# Patient Record
Sex: MALE | Race: ASIAN | Employment: STUDENT
[De-identification: names, ages, dates, MRNs, and addresses within clinical notes are randomized per-mention and may not be internally consistent; named-entity substitution may affect disease eponyms.]

---

## 2020-12-21 ENCOUNTER — HOSPITAL ENCOUNTER (INPATIENT)
Age: 20
End: 2020-12-21
Payer: COMMERCIAL

## 2020-12-21 ENCOUNTER — HOSPITAL ENCOUNTER (INPATIENT)
Facility: CLINIC | Age: 20
LOS: 1 days | Discharge: HOME | DRG: 881 | End: 2020-12-21
Attending: EMERGENCY MEDICINE | Admitting: PSYCHIATRY & NEUROLOGY
Payer: COMMERCIAL

## 2020-12-21 VITALS
DIASTOLIC BLOOD PRESSURE: 82 MMHG | WEIGHT: 117.7 LBS | RESPIRATION RATE: 16 BRPM | HEART RATE: 89 BPM | SYSTOLIC BLOOD PRESSURE: 117 MMHG | TEMPERATURE: 97.9 F | OXYGEN SATURATION: 96 %

## 2020-12-21 DIAGNOSIS — Z20.828 EXPOSURE TO SARS-ASSOCIATED CORONAVIRUS: ICD-10-CM

## 2020-12-21 DIAGNOSIS — F32.9 CURRENT EPISODE OF MAJOR DEPRESSIVE DISORDER WITHOUT PRIOR EPISODE, UNSPECIFIED DEPRESSION EPISODE SEVERITY: ICD-10-CM

## 2020-12-21 DIAGNOSIS — F41.1 GENERALIZED ANXIETY DISORDER: ICD-10-CM

## 2020-12-21 DIAGNOSIS — F32.A DEPRESSION, UNSPECIFIED DEPRESSION TYPE: ICD-10-CM

## 2020-12-21 LAB
AMPHETAMINES UR QL SCN: NEGATIVE
BARBITURATES UR QL: NEGATIVE
BENZODIAZ UR QL: NEGATIVE
CANNABINOIDS UR QL SCN: NEGATIVE
COCAINE UR QL: NEGATIVE
ETHANOL UR QL SCN: NEGATIVE
FLUAV+FLUBV RNA SPEC QL NAA+PROBE: NEGATIVE
FLUAV+FLUBV RNA SPEC QL NAA+PROBE: NEGATIVE
LABORATORY COMMENT REPORT: NORMAL
OPIATES UR QL SCN: NEGATIVE
RSV RNA SPEC QL NAA+PROBE: NEGATIVE
SARS-COV-2 RNA SPEC QL NAA+PROBE: NEGATIVE
SPECIMEN SOURCE: NORMAL

## 2020-12-21 PROCEDURE — 124N000002 HC R&B MH UMMC

## 2020-12-21 PROCEDURE — 80320 DRUG SCREEN QUANTALCOHOLS: CPT | Performed by: EMERGENCY MEDICINE

## 2020-12-21 PROCEDURE — 90791 PSYCH DIAGNOSTIC EVALUATION: CPT

## 2020-12-21 PROCEDURE — 99284 EMERGENCY DEPT VISIT MOD MDM: CPT | Performed by: EMERGENCY MEDICINE

## 2020-12-21 PROCEDURE — 80307 DRUG TEST PRSMV CHEM ANLYZR: CPT | Performed by: EMERGENCY MEDICINE

## 2020-12-21 PROCEDURE — 87636 SARSCOV2 & INF A&B AMP PRB: CPT | Performed by: EMERGENCY MEDICINE

## 2020-12-21 PROCEDURE — 99285 EMERGENCY DEPT VISIT HI MDM: CPT | Mod: 25 | Performed by: EMERGENCY MEDICINE

## 2020-12-21 PROCEDURE — 99235 HOSP IP/OBS SAME DATE MOD 70: CPT | Mod: GT | Performed by: CLINICAL NURSE SPECIALIST

## 2020-12-21 RX ORDER — HYDROXYZINE HYDROCHLORIDE 25 MG/1
25-50 TABLET, FILM COATED ORAL EVERY 4 HOURS PRN
Status: DISCONTINUED | OUTPATIENT
Start: 2020-12-21 | End: 2020-12-21 | Stop reason: HOSPADM

## 2020-12-21 RX ORDER — MAGNESIUM HYDROXIDE/ALUMINUM HYDROXICE/SIMETHICONE 120; 1200; 1200 MG/30ML; MG/30ML; MG/30ML
30 SUSPENSION ORAL EVERY 4 HOURS PRN
Status: DISCONTINUED | OUTPATIENT
Start: 2020-12-21 | End: 2020-12-21 | Stop reason: HOSPADM

## 2020-12-21 RX ORDER — AMOXICILLIN 250 MG
1 CAPSULE ORAL 2 TIMES DAILY PRN
Status: DISCONTINUED | OUTPATIENT
Start: 2020-12-21 | End: 2020-12-21 | Stop reason: HOSPADM

## 2020-12-21 RX ORDER — LANOLIN ALCOHOL/MO/W.PET/CERES
3 CREAM (GRAM) TOPICAL
COMMUNITY

## 2020-12-21 RX ORDER — ACETAMINOPHEN 325 MG/1
650 TABLET ORAL EVERY 4 HOURS PRN
Status: DISCONTINUED | OUTPATIENT
Start: 2020-12-21 | End: 2020-12-21 | Stop reason: HOSPADM

## 2020-12-21 RX ORDER — TRAZODONE HYDROCHLORIDE 50 MG/1
50 TABLET, FILM COATED ORAL
Status: DISCONTINUED | OUTPATIENT
Start: 2020-12-21 | End: 2020-12-21 | Stop reason: HOSPADM

## 2020-12-21 SDOH — HEALTH STABILITY: MENTAL HEALTH: HOW OFTEN DO YOU HAVE A DRINK CONTAINING ALCOHOL?: NEVER

## 2020-12-21 ASSESSMENT — ACTIVITIES OF DAILY LIVING (ADL)
HYGIENE/GROOMING: INDEPENDENT
LAUNDRY: WITH SUPERVISION
ORAL_HYGIENE: INDEPENDENT
HYGIENE/GROOMING: INDEPENDENT
DRESS: INDEPENDENT
DRESS: INDEPENDENT
LAUNDRY: WITH SUPERVISION
ORAL_HYGIENE: INDEPENDENT

## 2020-12-21 NOTE — SAFE
Milton Enriquez  December 21, 2020    Due to SI with a plan to drown himself in the river and being found by police at the river after walking for an hour with nothing but a lightweight shirt, pants, and shoes; this patient will be admitted for further assessment, safety, and stabilization.  He denied SIB or HI.  He dened Psychosis or Kita.  The ED MD agreed to this plan.  He was not aggressive.        Current Suicidal Ideation/Self-Injurious Concerns/Methods: Other drown in the river    Inappropriate Sexual Behavior: No    Aggression/Homicidal Ideation: None - N/A      For additional details see full DEC assessment.       Tricia Ewing, LICSW

## 2020-12-21 NOTE — PLAN OF CARE
BEHAVIORAL TEAM DISCUSSION    Participants: 4A Provider: Debra Naegele, APRN, CNS; 4A RN's:  Apoorva Sandoval, RN; 4A CTC's:  Flor Smith, (CTC).  Progress: Improving.  Continued Stay Criteria/Rationale: Discharging today  Medical/Physical: None mentioned in report  Precautions:    Behavioral Orders   Procedures    Code 1 - Restrict to Unit    Discontinue 1:1 attendant for suicide risk     Order Specific Question:   I have performed an in person assessment of the patient     Answer:   Based on this assessment the patient no longer requires a one on one attendant at this point in time.    Routine Programming     As clinically indicated    Status 15     Every 15 minutes.     Plan: CTC will complete psychosocial assessment. CTC will coordinate disposition and after care planning.  The following services will be provided to the patient; psychiatric assessment, medication management, therapeutic milieu, individual and group support, art therapy, and skills/OT groups.   Rationale for change in precautions or plan: No Change.

## 2020-12-21 NOTE — H&P
"Admitted:     12/21/2020      COMBINATION HISTORY AND PHYSICAL AND DISCHARGE SUMMARY      CONSENT FOR TELEMEDICINE VISIT:  The patient's condition can be safely assessed and treated via synchronous audio and visual telemedicine encounter.      START TIME:  1:00 p.m.      STOP TIME:  1:30 p.m.      REASON FOR TELEMEDICINE VISIT:  COVID-19.      ORIGINATING SITE (PATIENT LOCATION):  [facility], unit 4A.      DISTANT SITE (PROVIDER LOCATION):  Provider in remote setting.      CONSENT:  The patient/guardian has verbally consented to potential risks and benefits of telemedicine (video visit) versus in-person care, bill my insurance or make self-payment for services provided and responsibility for payment of noncovered services.      MODE OF COMMUNICATION:  Video conference via [employer].      As the provider, I attest to compliance with applicable laws and regulations related to telemedicine.      CHIEF COMPLAINT:  Evaluation for suicidal ideation.      IDENTIFYING INFORMATION:  Milton Enriquez is a 20-year-old  single male presenting with suicidal ideation.      HISTORY OF PRESENT ILLNESS:  Milton Enriquez is a 20-year-old single  male presenting with suicidal ideation.  The patient reports that he had a friend who he does not trust anymore.  The patient states this was a situation which he reports that he was \"acting irrationally.\"  The patient states that he will no longer have contact with his friend.  The patient states after the conflict with the friend, he left his apartment without any coat.  He was walking around for about an hour in a short-sleeved shirt.  The patient was found by the police at the [place].  The patient was planning on drowning himself.  The patient states that his roommates must have called because he left the apartment in a short-sleeved shirt.  The patient states that his main stressor is conflict with his friend.  The patient states he has some anxiety regarding school.  He has an " "electrical engineering final that is due.  The patient states he has no prior mental health.  Has never been on medications in the past.  No therapy.  The patient does report a poor social network.  The patient states that his roommates are his friends from high school.  The patient reports that he is not close with his roommates.  He is not close with his family.  The patient states that he wants to make every effort to make \"better friends.\"  The patient's goal for this hospitalization is therapy to process the conflict with his friend.  The patient is declining medication at this time.      PSYCHIATRIC REVIEW OF SYSTEMS:  The patient reports that he has \"mild depressive mood.\"  The patient reports that he does have a poor social network.  The patient states recently he has been isolating, but that is due to the COVID virus.  The patient states motivation has been low because of online schooling.  The patient reports that he has been going to all of his classes.  He is not missing school.  He is completing his homework.  The patient denies any problem with focus.  He states sleep and appetite are \"good.\"  The patient is denying anhedonia.  The patient reports that prior to the conflict with his friend, he had fleeting passive suicidal thinking that would last for a couple of seconds on and off.  The patient does not believe it was a problem.  The patient is denying passive suicidal ideation at this time.  He is denying any active intent.  He denies homicidal ideation.  The patient reports that he gets anxious around finals and evaluations around school.  Otherwise, he does not have an issue with anxiety.  Denies panic attacks.  He does not endorse any symptoms of micheline.  He does not endorse any symptoms of psychosis including auditory or visual hallucinations or feelings of paranoia.  The patient denies any symptoms of PTSD, eating disorder or OCD.      PSYCHIATRIC HISTORY:  No prior inpatient hospitalizations, no " prior mental health treatments.  The patient does not have any history of medications.      PAST MEDICAL HISTORY:  No acute issues.  COVID screen is negative.  Influenza A and B are negative.      SUBSTANCE ABUSE HISTORY:  U-tox is negative.  The patient denies any substance abuse including cannabis and alcohol.      FAMILY HISTORY:  The patient denies any mental health issues in his family.  The patient reports he is the youngest of 3 brothers.      SOCIAL HISTORY:  The patient reports that he was born in [place].  He spent most of his life in [place].  He is on his second year at the [employer] in the science and [employer] program.  The patient lives in an apartment with 2 other roommates.  The patient reports he has known these roommates since high school.      ALLERGIES:  NO KNOWN ALLERGIES.      MEDICAL REVIEW OF SYSTEMS:  A complete review of systems was performed with pertinent positives and negatives noted in HPI.  All other systems are negative.      PHYSICAL EXAMINATION:   VITAL SIGNS:  Blood pressure 112/79, pulse 84, temp 97.6 Fahrenheit, respiration 14.  Weight 125 pounds.  Reviewed documentation for physical examination completed by Aurora Tello MD, dated 12/21/2020.  No changes are noted.      MENTAL STATUS EXAMINATION:  The patient appears his stated age.  He is dressed in scrubs.  He is cooperative with wearing his mask.  He wears glasses.  He has adequate hygiene.  The patient was cooperative in meeting with provider in his room.  Staff was present.  The patient was calm and cooperative throughout the interview.  The patient was somewhat distracted.  There was screaming out on the unit, but the patient was able to focus on the interview with provider.  Eye contact was fair; at times, patient would look away due to the screaming that was on the unit.  He did not display psychomotor abnormalities.  Speech was spontaneous.  He used conversational rate, rhythm and tone.  Mood is  described as mildly depressed.  Affect is full range, congruent.  Thought process:  Linear and logical.  Associations intact.  Thought content did not display evidence of psychosis.  He denies passive suicidal thinking.  Denies active intent.  He denies homicidal thinking.  Insight and judgment appear to be fair.  Cognition appears intact to interviewing including orientation to person, place, time and situation, use of language and fund of knowledge.  Recent and remote memory are grossly intact.  Muscle strength, tone and gait appear within normal limits upon observation.      ASSESSMENT:  Adjustment disorder with depressive symptoms.      PLAN:   1.  The patient has been admitted to behavioral unit 4A on a 72-hour hold.  Discontinued hold.  The patient is cooperative, and the patient will sign in voluntarily.   2.  Discussed medications with the patient.  The patient is declining medication at this time.  He does not believe he needs medication.  The patient was interested in therapy.  The patient will be given referrals for therapy.    3.  Today, Milton Enriquez reports no suicidal ideation.  In addition, he has notable risk factors for self-harm, including age, single status; however, risk is mitigated by protective factors including commitment to family, the patient stated that it would devastate his parents if any harm came to him, sobriety, absence of past events, ability to volunteer a safety plan, history of seeking help when needed.  Therefore, based on all available evidence including the factors cited above, the patient does not appear to be at imminent risk for self-harm, does not meet criteria for a 72-hour hold and therefore remains appropriate for ongoing outpatient level of care.  Voluntary referral for therapy was offered to patient, and he accepted this offer.   3.  Psychosocial treatments to be addressed with CTC.   4.  The patient will discharge to home on 12/21.         DEBRA A. NAEGELE, APRN, CNS              D: 2020   T: 2020   MT: LUC      Name:     IGOR LABOY   MRN:      [id number]        Account:      [id number]   :      2000        Admitted:     2020                   Document: [id number]

## 2020-12-21 NOTE — PROGRESS NOTES
Work Completed: Attended team. Pt was briefly dicussed. He arrived to the unit around 1pm and provider is discharging him this afternoon-psychosocial assessment was not completely due to pt being on the unit very briefly.     AVS completed.     Discharge plan or goal: Home with Therapy referrals and resources                Barriers to discharge: None patient is discharging this afternoon.

## 2020-12-21 NOTE — ED NOTES
ED to Behavioral Floor Handoff    SITUATION  Milton Enriquez is a 20 year old male who speaks English and lives in a home with others The patient arrived in the ED by ambulance from home with a complaint of Suicidal  .The patient's current symptoms started/worsened 1 day(s) ago and during this time the symptoms have increased.   In the ED, pt was diagnosed with   Final diagnoses:   Depression, unspecified depression type        Initial vitals were: BP: 112/79  Pulse: 84  Temp: 97.6  F (36.4  C)  Resp: 14  Weight: 56.7 kg (125 lb)  SpO2: 96 %   --------  Is the patient diabetic? No   If yes, last blood glucose? --     If yes, was this treated in the ED? --  --------  Is the patient inebriated (ETOH) No or Impaired on other substances? No  MSSA done? No  Last MSSA score: --    Were withdrawal symptoms treated? N/A  Does the patient have a seizure history? No. If yes, date of most recent seizure--  --------  Is the patient patient experiencing suicidal ideation? reports suicidal ideation with out intention or a suicidal plan    Homicidal ideation? denies current or recent homicidal ideation or behaviors.    Self-injurious behavior/urges? denies current or recent self injurious behavior or ideation.  ------  Was pt aggressive in the ED No  Was a code called No  Is the pt now cooperative? No  -------  Meds given in ED: Medications - No data to display   Family present during ED course? No  Family currently present? No    BACKGROUND  Does the patient have a cognitive impairment or developmental disability? No  Allergies: No Known Allergies.   Social demographics are   Social History     Socioeconomic History     Marital status: Single     Spouse name: None     Number of children: None     Years of education: None     Highest education level: None   Occupational History     None   Social Needs     Financial resource strain: None     Food insecurity     Worry: None     Inability: None     Transportation needs     Medical: None      Non-medical: None   Tobacco Use     Smoking status: Never Smoker     Smokeless tobacco: Never Used   Substance and Sexual Activity     Alcohol use: Never     Frequency: Never     Drug use: Never     Sexual activity: None   Lifestyle     Physical activity     Days per week: None     Minutes per session: None     Stress: None   Relationships     Social connections     Talks on phone: None     Gets together: None     Attends Tenriism service: None     Active member of club or organization: None     Attends meetings of clubs or organizations: None     Relationship status: None     Intimate partner violence     Fear of current or ex partner: None     Emotionally abused: None     Physically abused: None     Forced sexual activity: None   Other Topics Concern     None   Social History Narrative     None        ASSESSMENT  Labs results   Labs Ordered and Resulted from Time of ED Arrival Up to the Time of Departure from the ED   DRUG ABUSE SCREEN 6 CHEM DEP URINE ([facility])   INFLUENZA A/B & SARS-COV2 PCR MULTIPLEX      Imaging Studies: No results found for this or any previous visit (from the past 24 hour(s)).   Most recent vital signs /79   Pulse 84   Temp 97.6  F (36.4  C) (Tympanic)   Resp 14   Wt 56.7 kg (125 lb)   SpO2 96%    Abnormal labs/tests/findings requiring intervention:---   Pain control: pt had none  Nausea control: pt had none    RECOMMENDATION  Are any infection precautions needed (MRSA, VRE, etc.)? No If yes, what infection? --  ---  Does the patient have mobility issues? independently. If yes, what device does the pt use? ---  ---  Is patient on 72 hour hold or commitment? Yes If on 72 hour hold, have hold and rights been given to patient? Yes  Are admitting orders written if after 10 p.m. ?No  Tasks needing to be completed:---     Haritha Smith, RN   ascom--    [phone] [place] [facility]   [phone] [facility]

## 2020-12-21 NOTE — DISCHARGE INSTRUCTIONS
Behavioral Discharge Planning and Instructions      Summary:  You were admitted on 12/21/2020  due to Suicidal Ideations.  You were treated by Debra Naegele, APRN, CNS and discharged on 12/21/2020 from Station 4A to Home      Principal Diagnosis:   Major Depressive Disorder, Single episode, unspecified  Generalized Anxiety Disorder    Health Care Follow-up Appointments:   Therapy Referrals:   Psych Recovery:   [address], [place] [address]  Phone:  [phone]     [facility]  [address]  [address]   [place], [place] [address] [phone]    [facility]:  [address], [address] [address]  Phone: [phone]    [facility]   [address] [place] [address] [phone]    Free counseling and services [facility] [place] [facility]  Walk-In at [facility]  Address: [address] [place] [address]  Phone: [phone]  Hours: Monday and Wednesday: 5:00PM - 7:00PM  Free services and counseling     Walk-In at [facility]  Address: [address] [place] [address]  Phone: [phone]  Hours: Tuesday and Thursday: 6:00PM - 8:00PM  Free services and counseling     Walk [facility]  Address: [address], [address]  Phone: [phone] (Call for hours of operation)  Free services and counseling     [facility]  [facility] ([facility]) is a free clinic operated by [facility].   Hours: Monday and Thursday 6:00 PM - 9:00 PM  Phone Number: [phone]  Location: [address] [place] [address]  No appointments or insurance necessary! Indonesian interpreters are available at all times    Attend all scheduled appointments with your outpatient providers. Call at least 24 hours in advance if you need to reschedule an  "appointment to ensure continued access to your outpatient providers.   Major Treatments, Procedures and Findings:  You were provided with: a psychiatric assessment, assessed for medical stability, medication evaluation and/or management and group therapy    Symptoms to Report: feeling more aggressive, increased confusion, losing more sleep, mood getting worse or thoughts of suicide    Early warning signs can include: increased depression or anxiety sleep disturbances increased thoughts or behaviors of suicide or self-harm  increased unusual thinking, such as paranoia or hearing voices    Safety and Wellness:  Take all medicines as directed.  Make no changes unless your doctor suggests them.      Follow treatment recommendations.  Refrain from alcohol and non-prescribed drugs.  Ask your support system to help you reduce your access to items that could harm yourself or others. If there is a concern for safety, call 911.    Resources:   Crisis Intervention: 835.781.2357 or 937-258-2233 (TTY: 797.369.4699).  Call anytime for help.  National [place] on Mental Illness (www.[place].rylie.org): 717.605.1015 or 261-670-6753.  National Suicide Prevention Line (www.mentalhealthmn.org): 759-222-FYIQ (7029)  [facility] Crisis (COPE) Response - Adult 558 327-3646  Text 4 Life: txt \"LIFE\" to 11959 for immediate support and crisis intervention  Crisis text line: Text \"[place]\" to 948306. Free, confidential, 24/7.  [facility] Call for Help ([employer] [employer]) 740.462.2816 or 2-1-1    [facility] Crisis Stabilization Program  The Crisis Stabilization Program helps people who have an urgent concern related to their mental health or substance use disorder connect to case management to help them get the support and services they need.    Care coordinators, peer recovery specialists, and health professionals address clients' physical health, mental health, addiction issues including housing, health insurance, general financial assistance, Rule 25 " assessments and other resources. Services are available on a walk-in basis.  Hours:10:00 am - 5:00 pm  Phone Number: [phone]  Location: [address]    [facility]- Acute Psychiatric Services:[phone]  Services 24-hour walk-in crisis intervention and treatment of behavioral emergencies. Crisis intervention phone service for assessment, information, and referral for psychiatric emergencies. Treatment of psychiatric emergencies such as acute psychotic conditions, panic states, severe and incapacitating depression, suicidal crisis, danger to others, sudden loss of memory, and situations involving grave mental disability. Community consultation and education on crisis intervention    Lifestyle Adjustment:   1. Adjust your lifestyle to get enough sleep, relaxation, exercise and good nutrition.  Continue to develop healthy coping skills to decrease stress and promote a healthy lifestyle.  2. Abstain from all substances of abuse.  3. Take medications as prescribed.  Please work with your doctor to discuss any concerns you have with your medications or side affects you may be experiencing.  4. Follow up with appointments as scheduled.      General Medication Instructions:   1. See your medication sheet(s) for instructions.   2. Take all medicines as directed.  Make no changes unless your doctor suggests them.   3. Go to all your doctor visits.  4. Be sure to have all your required lab tests. This way, your medicines can be refilled on time.  5. Do not use any drugs not prescribed by your doctor.    The treatment team has appreciated the opportunity to work with you.     Milton, please take care and make your recovery a daily recovery.   If you have any questions or concerns our unit number is [phone]    If you would like to obtain any specific documentation regarding your hospitalization after your discharge, contact [facility] Release of Information/Medical Records:  [phone]

## 2020-12-21 NOTE — PROGRESS NOTES
A               Admission:  I am responsible for any personal items that are not sent to the safe or pharmacy.  [facility] is not responsible for loss, theft or damage of any property in my possession.    Signature:  _________________________________ Date: _______  Time: _____                                              Staff Signature:  ____________________________ Date: ________  Time: _____      2nd Staff person, if patient is unable/unwilling to sign:    Signature: ________________________________ Date: ________  Time: _____     Discharge:  [facility] has returned all of my personal belongings:    Signature: _________________________________ Date: ________  Time: _____                                          Staff Signature:  ____________________________ Date: ________  Time: _____          In bin:   Abiodun key chain attached to ID wallet calvert  ID, No cash no  credit cards  Yellow shirt   Plaid underwear  Black shorts with strings  [employer] tennis shoes  Black socks

## 2020-12-21 NOTE — PLAN OF CARE
"  Problem: Adult Inpatient Plan of Care  Goal: Plan of Care Review  Outcome: No Change     S: Pt.is a 20 year old male admitted from [facility] adult ED due to worsening suicidal ideation with a plan. Pt.is placed on 72 hour hold. Hold started at 0655 on 12/21/2020. Hold will end on Thursday, 12/24/2020.    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 This is his first inpatient mental health admission. He reports the covid-19 pandemic and quarantine has affected him negatively. Reports he has been very isolative, has limited people to talk to, classes have been virtual/online. He identifies other stressors as final exams approaching and not feeling he has been doing well enough. Pt.was picked up by the police while he was out for about an hour without a winter coat. He was about to jump into the river to drown himself before the timely intervention of the police. He has no prior mental health diagnosis. He does not have any medical concerns/diagnosis.      A: Pt.appeared very anxious upon admit to the unit. He was cooperative with admission safety search and interview. He denied current SI/SIB/hallucinations. Endorsed feeling depressed, anxious, hopeless about the future, and worthless. Contracted for safety. Affect blunted and sad. Poor eye contact. Poor insight and judgement. He was perseverating about his \"finals\".     R: Covid-19 result negative. UTOX negative. Staff started 15 minutes safety checks. He did not have prior to admission (PTA) scheduled medication(s). No known allergies to foods or medications.     "

## 2020-12-21 NOTE — ED PROVIDER NOTES
ED Provider Note  [facility]      History     Chief Complaint   Patient presents with     Suicidal     HPI  Milton Enriquez is a 20 year old male who presents for mental health evaluation. He walked outside without a jacket for about an hour, walked to the river and was just about to walk into the river to drown himself when the police found him. His roommates were worried and had called the police. He is stressed from school, states doesn't feel anyone cares for him. He doesn't have any friends at school, isn't close with family or roommates. States he doesn't think he would hurt himself if he leaves here. Denies substance issues. He denies any acute physical complaints.     Past Medical History  History reviewed. No pertinent past medical history.  History reviewed. No pertinent surgical history.       melatonin 3 MG tablet      No Known Allergies  Family History  History reviewed. No pertinent family history.  Social History   Social History     Tobacco Use     Smoking status: Never Smoker     Smokeless tobacco: Never Used   Substance Use Topics     Alcohol use: Never     Frequency: Never     Drug use: Never      Past medical history, past surgical history, medications, allergies, family history, and social history were reviewed with the patient. No additional pertinent items.       Review of Systems  A complete review of systems was performed with pertinent positives and negatives noted in the HPI, and all other systems negative.    Physical Exam   BP: 112/79  Pulse: 84  Temp: 97.6  F (36.4  C)  Resp: 14  Weight: 56.7 kg (125 lb)  SpO2: 96 %  Physical Exam  Constitutional:       General: He is not in acute distress.     Appearance: He is not diaphoretic.   HENT:      Head: Atraumatic.   Eyes:      General: No scleral icterus.  Cardiovascular:      Heart sounds: Normal heart sounds.   Pulmonary:      Effort: No respiratory distress.      Breath sounds: Normal breath sounds.   Abdominal:       Palpations: Abdomen is soft.      Tenderness: There is no abdominal tenderness.   Musculoskeletal:         General: No tenderness.   Skin:     General: Skin is warm.      Findings: No rash.         ED Course      Procedures                         No results found for any visits on 12/21/20.  Medications - No data to display     Assessments & Plan (with Medical Decision Making)   The patient is very focused on his final exam that he is scheduled this morning, and seems to be bargaining to be released.  However, prior to this discussion he told me that he had the police not stopped him he would have killed himself yesterday.  Very uncomfortable with the idea of discharging him as I feel that he is not stable for discharge and remains a threat to himself.  He does appear medically stable though.  He is placed on a 72-hour hold and will be admitted to psychiatry for acute psychiatric stabilization and treatment.    Dictation Disclaimer: Some of this Note has been completed with voice-recognition dictation software. Although errors are generally corrected real-time, there is the potential for a rare error to be present in the completed chart.      I have reviewed the nursing notes. I have reviewed the findings, diagnosis, plan and need for follow up with the patient.    New Prescriptions    No medications on file       Final diagnoses:   Depression, unspecified depression type       --  Aurora Tello  [facility] EMERGENCY DEPARTMENT  12/21/2020     Aurora Tello MD  12/21/20 0711

## 2020-12-30 NOTE — H&P
"Admitted:     12/21/2020      COMBINATION HISTORY AND PHYSICAL AND DISCHARGE SUMMARY      CONSENT FOR TELEMEDICINE VISIT:  The patient's condition can be safely assessed and treated via synchronous audio and visual telemedicine encounter.      START TIME:  1:00 p.m.      STOP TIME:  1:30 p.m.      REASON FOR TELEMEDICINE VISIT:  COVID-19.      ORIGINATING SITE (PATIENT LOCATION):  [facility], unit 4A.      DISTANT SITE (PROVIDER LOCATION):  Provider in remote setting.      CONSENT:  The patient/guardian has verbally consented to potential risks and benefits of telemedicine (video visit) versus in-person care, bill my insurance or make self-payment for services provided and responsibility for payment of noncovered services.      MODE OF COMMUNICATION:  Video conference via [employer].      As the provider, I attest to compliance with applicable laws and regulations related to telemedicine.      CHIEF COMPLAINT:  Evaluation for suicidal ideation.      IDENTIFYING INFORMATION:  Milton Enriquez is a 20-year-old  single male presenting with suicidal ideation.      HISTORY OF PRESENT ILLNESS:  Milton Enriquez is a 20-year-old single  male presenting with suicidal ideation.  The patient reports that he had a friend who he does not trust anymore.  The patient states this was a situation which he reports that he was \"acting irrationally.\"  The patient states that he will no longer have contact with his friend.  The patient states after the conflict with the friend, he left his apartment without any coat.  He was walking around for about an hour in a short-sleeved shirt.  The patient was found by the police at the [place].  The patient was planning on drowning himself.  The patient states that his roommates must have called because he left the apartment in a short-sleeved shirt.  The patient states that his main stressor is conflict with his friend.  The patient states he has some anxiety regarding school.  He has an " "electrical engineering final that is due.  The patient states he has no prior mental health.  Has never been on medications in the past.  No therapy.  The patient does report a poor social network.  The patient states that his roommates are his friends from high school.  The patient reports that he is not close with his roommates.  He is not close with his family.  The patient states that he wants to make every effort to make \"better friends.\"  The patient's goal for this hospitalization is therapy to process the conflict with his friend.  The patient is declining medication at this time.      PSYCHIATRIC REVIEW OF SYSTEMS:  The patient reports that he has \"mild depressive mood.\"  The patient reports that he does have a poor social network.  The patient states recently he has been isolating, but that is due to the COVID virus.  The patient states motivation has been low because of online schooling.  The patient reports that he has been going to all of his classes.  He is not missing school.  He is completing his homework.  The patient denies any problem with focus.  He states sleep and appetite are \"good.\"  The patient is denying anhedonia.  The patient reports that prior to the conflict with his friend, he had fleeting passive suicidal thinking that would last for a couple of seconds on and off.  The patient does not believe it was a problem.  The patient is denying passive suicidal ideation at this time.  He is denying any active intent.  He denies homicidal ideation.  The patient reports that he gets anxious around finals and evaluations around school.  Otherwise, he does not have an issue with anxiety.  Denies panic attacks.  He does not endorse any symptoms of micheline.  He does not endorse any symptoms of psychosis including auditory or visual hallucinations or feelings of paranoia.  The patient denies any symptoms of PTSD, eating disorder or OCD.      PSYCHIATRIC HISTORY:  No prior inpatient hospitalizations, no " prior mental health treatments.  The patient does not have any history of medications.      PAST MEDICAL HISTORY:  No acute issues.  COVID screen is negative.  Influenza A and B are negative.      SUBSTANCE ABUSE HISTORY:  U-tox is negative.  The patient denies any substance abuse including cannabis and alcohol.      FAMILY HISTORY:  The patient denies any mental health issues in his family.  The patient reports he is the youngest of 3 brothers.      SOCIAL HISTORY:  The patient reports that he was born in [place].  He spent most of his life in [place].  He is on his second year at the [employer] in the science and [employer] program.  The patient lives in an apartment with 2 other roommates.  The patient reports he has known these roommates since high school.      ALLERGIES:  NO KNOWN ALLERGIES.      MEDICAL REVIEW OF SYSTEMS:  A complete review of systems was performed with pertinent positives and negatives noted in HPI.  All other systems are negative.      PHYSICAL EXAMINATION:   VITAL SIGNS:  Blood pressure 112/79, pulse 84, temp 97.6 Fahrenheit, respiration 14.  Weight 125 pounds.  Reviewed documentation for physical examination completed by Aurora Tello MD, dated 12/21/2020.  No changes are noted.      MENTAL STATUS EXAMINATION:  The patient appears his stated age.  He is dressed in scrubs.  He is cooperative with wearing his mask.  He wears glasses.  He has adequate hygiene.  The patient was cooperative in meeting with provider in his room.  Staff was present.  The patient was calm and cooperative throughout the interview.  The patient was somewhat distracted.  There was screaming out on the unit, but the patient was able to focus on the interview with provider.  Eye contact was fair; at times, patient would look away due to the screaming that was on the unit.  He did not display psychomotor abnormalities.  Speech was spontaneous.  He used conversational rate, rhythm and tone.  Mood is  described as mildly depressed.  Affect is full range, congruent.  Thought process:  Linear and logical.  Associations intact.  Thought content did not display evidence of psychosis.  He denies passive suicidal thinking.  Denies active intent.  He denies homicidal thinking.  Insight and judgment appear to be fair.  Cognition appears intact to interviewing including orientation to person, place, time and situation, use of language and fund of knowledge.  Recent and remote memory are grossly intact.  Muscle strength, tone and gait appear within normal limits upon observation.      ASSESSMENT:  Adjustment disorder with depressive symptoms.      PLAN:   1.  The patient has been admitted to behavioral unit 4A on a 72-hour hold.  Discontinued hold.  The patient is cooperative, and the patient will sign in voluntarily.   2.  Discussed medications with the patient.  The patient is declining medication at this time.  He does not believe he needs medication.  The patient was interested in therapy.  The patient will be given referrals for therapy.    3.  Today, Milton Enriquez reports no suicidal ideation.  In addition, he has notable risk factors for self-harm, including age, single status; however, risk is mitigated by protective factors including commitment to family, the patient stated that it would devastate his parents if any harm came to him, sobriety, absence of past events, ability to volunteer a safety plan, history of seeking help when needed.  Therefore, based on all available evidence including the factors cited above, the patient does not appear to be at imminent risk for self-harm, does not meet criteria for a 72-hour hold and therefore remains appropriate for ongoing outpatient level of care.  Voluntary referral for therapy was offered to patient, and he accepted this offer.   3.  Psychosocial treatments to be addressed with [facility].   4.  The patient will discharge to home on 12/21.         DEBRA A. NAEGELE, APRN, CNS              D: 2020   T: 2020   MT: LUC      Name:     IGOR LABOY   MRN:      [id number]        Account:      [id number]   :      2000        Admitted:     2020                   Document: [id number]

## 2023-11-25 NOTE — PLAN OF CARE
Discharge orders are received.  Reviewed and discussed discharge instructions, follow up care, future appointment.  Patient is not discharged with medication. Patient denies thoughts of SI, SIB or hallucinations.  Verbalized understanding of discharge instructions. Received personal belongings.  All forms are signed.  Patient to discharge to home.     No